# Patient Record
Sex: MALE | Race: WHITE | HISPANIC OR LATINO | Employment: OTHER | URBAN - METROPOLITAN AREA
[De-identification: names, ages, dates, MRNs, and addresses within clinical notes are randomized per-mention and may not be internally consistent; named-entity substitution may affect disease eponyms.]

---

## 2018-10-17 ENCOUNTER — APPOINTMENT (OUTPATIENT)
Dept: CARDIOLOGY | Facility: MEDICAL CENTER | Age: 53
DRG: 247 | End: 2018-10-17
Attending: INTERNAL MEDICINE
Payer: COMMERCIAL

## 2018-10-17 ENCOUNTER — APPOINTMENT (OUTPATIENT)
Dept: RADIOLOGY | Facility: MEDICAL CENTER | Age: 53
DRG: 247 | End: 2018-10-17
Attending: EMERGENCY MEDICINE
Payer: COMMERCIAL

## 2018-10-17 ENCOUNTER — HOSPITAL ENCOUNTER (OUTPATIENT)
Dept: RADIOLOGY | Facility: MEDICAL CENTER | Age: 53
End: 2018-10-17

## 2018-10-17 ENCOUNTER — HOSPITAL ENCOUNTER (INPATIENT)
Facility: MEDICAL CENTER | Age: 53
LOS: 1 days | DRG: 247 | End: 2018-10-18
Attending: EMERGENCY MEDICINE | Admitting: HOSPITALIST
Payer: COMMERCIAL

## 2018-10-17 DIAGNOSIS — I21.4 NON-ST ELEVATION (NSTEMI) MYOCARDIAL INFARCTION (HCC): ICD-10-CM

## 2018-10-17 PROBLEM — Z72.0 TOBACCO USE: Status: ACTIVE | Noted: 2018-10-17

## 2018-10-17 PROBLEM — E66.09 CLASS 1 OBESITY DUE TO EXCESS CALORIES WITHOUT SERIOUS COMORBIDITY WITH BODY MASS INDEX (BMI) OF 30.0 TO 30.9 IN ADULT: Status: ACTIVE | Noted: 2018-10-17

## 2018-10-17 LAB
ALBUMIN SERPL BCP-MCNC: 3.7 G/DL (ref 3.2–4.9)
ALBUMIN/GLOB SERPL: 1.7 G/DL
ALP SERPL-CCNC: 51 U/L (ref 30–99)
ALT SERPL-CCNC: 16 U/L (ref 2–50)
ANION GAP SERPL CALC-SCNC: 8 MMOL/L (ref 0–11.9)
APTT PPP: 176.7 SEC (ref 24.7–36)
APTT PPP: 215.6 SEC (ref 24.7–36)
AST SERPL-CCNC: 16 U/L (ref 12–45)
BASOPHILS # BLD AUTO: 0.4 % (ref 0–1.8)
BASOPHILS # BLD: 0.05 K/UL (ref 0–0.12)
BILIRUB SERPL-MCNC: 0.3 MG/DL (ref 0.1–1.5)
BNP SERPL-MCNC: 12 PG/ML (ref 0–100)
BUN SERPL-MCNC: 19 MG/DL (ref 8–22)
CALCIUM SERPL-MCNC: 8.7 MG/DL (ref 8.5–10.5)
CHLORIDE SERPL-SCNC: 110 MMOL/L (ref 96–112)
CO2 SERPL-SCNC: 23 MMOL/L (ref 20–33)
CREAT SERPL-MCNC: 1.19 MG/DL (ref 0.5–1.4)
D DIMER PPP IA.FEU-MCNC: <0.4 UG/ML (FEU) (ref 0–0.5)
EKG IMPRESSION: NORMAL
EKG IMPRESSION: NORMAL
EOSINOPHIL # BLD AUTO: 0.25 K/UL (ref 0–0.51)
EOSINOPHIL NFR BLD: 1.9 % (ref 0–6.9)
ERYTHROCYTE [DISTWIDTH] IN BLOOD BY AUTOMATED COUNT: 42.7 FL (ref 35.9–50)
EST. AVERAGE GLUCOSE BLD GHB EST-MCNC: 123 MG/DL
GLOBULIN SER CALC-MCNC: 2.2 G/DL (ref 1.9–3.5)
GLUCOSE SERPL-MCNC: 114 MG/DL (ref 65–99)
HBA1C MFR BLD: 5.9 % (ref 0–5.6)
HCT VFR BLD AUTO: 43.4 % (ref 42–52)
HGB BLD-MCNC: 15.3 G/DL (ref 14–18)
IMM GRANULOCYTES # BLD AUTO: 0.07 K/UL (ref 0–0.11)
IMM GRANULOCYTES NFR BLD AUTO: 0.5 % (ref 0–0.9)
INR PPP: 1.04 (ref 0.87–1.13)
LV EJECT FRACT  99904: 65
LV EJECT FRACT MOD 2C 99903: 69
LV EJECT FRACT MOD 4C 99902: 62.1
LV EJECT FRACT MOD BP 99901: 66.72
LYMPHOCYTES # BLD AUTO: 2.4 K/UL (ref 1–4.8)
LYMPHOCYTES NFR BLD: 18.3 % (ref 22–41)
MCH RBC QN AUTO: 33.1 PG (ref 27–33)
MCHC RBC AUTO-ENTMCNC: 35.3 G/DL (ref 33.7–35.3)
MCV RBC AUTO: 93.9 FL (ref 81.4–97.8)
MONOCYTES # BLD AUTO: 0.7 K/UL (ref 0–0.85)
MONOCYTES NFR BLD AUTO: 5.3 % (ref 0–13.4)
NEUTROPHILS # BLD AUTO: 9.63 K/UL (ref 1.82–7.42)
NEUTROPHILS NFR BLD: 73.6 % (ref 44–72)
NRBC # BLD AUTO: 0 K/UL
NRBC BLD-RTO: 0 /100 WBC
PLATELET # BLD AUTO: 182 K/UL (ref 164–446)
PMV BLD AUTO: 10.2 FL (ref 9–12.9)
POTASSIUM SERPL-SCNC: 4.1 MMOL/L (ref 3.6–5.5)
PROT SERPL-MCNC: 5.9 G/DL (ref 6–8.2)
PROTHROMBIN TIME: 13.7 SEC (ref 12–14.6)
RBC # BLD AUTO: 4.62 M/UL (ref 4.7–6.1)
SODIUM SERPL-SCNC: 141 MMOL/L (ref 135–145)
TROPONIN I SERPL-MCNC: 1.17 NG/ML (ref 0–0.04)
TROPONIN I SERPL-MCNC: 2.82 NG/ML (ref 0–0.04)
TROPONIN I SERPL-MCNC: 3.58 NG/ML (ref 0–0.04)
TROPONIN I SERPL-MCNC: 4.18 NG/ML (ref 0–0.04)
TROPONIN I SERPL-MCNC: 4.69 NG/ML (ref 0–0.04)
WBC # BLD AUTO: 13.1 K/UL (ref 4.8–10.8)

## 2018-10-17 PROCEDURE — 027034Z DILATION OF CORONARY ARTERY, ONE ARTERY WITH DRUG-ELUTING INTRALUMINAL DEVICE, PERCUTANEOUS APPROACH: ICD-10-PCS | Performed by: INTERNAL MEDICINE

## 2018-10-17 PROCEDURE — 700102 HCHG RX REV CODE 250 W/ 637 OVERRIDE(OP): Performed by: INTERNAL MEDICINE

## 2018-10-17 PROCEDURE — C1894 INTRO/SHEATH, NON-LASER: HCPCS

## 2018-10-17 PROCEDURE — 93306 TTE W/DOPPLER COMPLETE: CPT

## 2018-10-17 PROCEDURE — 96366 THER/PROPH/DIAG IV INF ADDON: CPT

## 2018-10-17 PROCEDURE — 700117 HCHG RX CONTRAST REV CODE 255: Performed by: INTERNAL MEDICINE

## 2018-10-17 PROCEDURE — 99291 CRITICAL CARE FIRST HOUR: CPT

## 2018-10-17 PROCEDURE — 85730 THROMBOPLASTIN TIME PARTIAL: CPT

## 2018-10-17 PROCEDURE — 770020 HCHG ROOM/CARE - TELE (206)

## 2018-10-17 PROCEDURE — 93306 TTE W/DOPPLER COMPLETE: CPT | Mod: 26 | Performed by: INTERNAL MEDICINE

## 2018-10-17 PROCEDURE — 99152 MOD SED SAME PHYS/QHP 5/>YRS: CPT

## 2018-10-17 PROCEDURE — 85610 PROTHROMBIN TIME: CPT

## 2018-10-17 PROCEDURE — 83880 ASSAY OF NATRIURETIC PEPTIDE: CPT

## 2018-10-17 PROCEDURE — 85379 FIBRIN DEGRADATION QUANT: CPT

## 2018-10-17 PROCEDURE — 700111 HCHG RX REV CODE 636 W/ 250 OVERRIDE (IP)

## 2018-10-17 PROCEDURE — 92928 PRQ TCAT PLMT NTRAC ST 1 LES: CPT | Mod: LC | Performed by: INTERNAL MEDICINE

## 2018-10-17 PROCEDURE — 99245 OFF/OP CONSLTJ NEW/EST HI 55: CPT | Performed by: INTERNAL MEDICINE

## 2018-10-17 PROCEDURE — A9270 NON-COVERED ITEM OR SERVICE: HCPCS | Performed by: INTERNAL MEDICINE

## 2018-10-17 PROCEDURE — 71045 X-RAY EXAM CHEST 1 VIEW: CPT

## 2018-10-17 PROCEDURE — 700111 HCHG RX REV CODE 636 W/ 250 OVERRIDE (IP): Performed by: HOSPITALIST

## 2018-10-17 PROCEDURE — 99153 MOD SED SAME PHYS/QHP EA: CPT

## 2018-10-17 PROCEDURE — 80053 COMPREHEN METABOLIC PANEL: CPT

## 2018-10-17 PROCEDURE — 36415 COLL VENOUS BLD VENIPUNCTURE: CPT

## 2018-10-17 PROCEDURE — B2151ZZ FLUOROSCOPY OF LEFT HEART USING LOW OSMOLAR CONTRAST: ICD-10-PCS | Performed by: INTERNAL MEDICINE

## 2018-10-17 PROCEDURE — 93458 L HRT ARTERY/VENTRICLE ANGIO: CPT | Mod: 26,59 | Performed by: INTERNAL MEDICINE

## 2018-10-17 PROCEDURE — 99223 1ST HOSP IP/OBS HIGH 75: CPT | Performed by: HOSPITALIST

## 2018-10-17 PROCEDURE — 84484 ASSAY OF TROPONIN QUANT: CPT

## 2018-10-17 PROCEDURE — 96375 TX/PRO/DX INJ NEW DRUG ADDON: CPT

## 2018-10-17 PROCEDURE — A9270 NON-COVERED ITEM OR SERVICE: HCPCS

## 2018-10-17 PROCEDURE — C1887 CATHETER, GUIDING: HCPCS

## 2018-10-17 PROCEDURE — 96365 THER/PROPH/DIAG IV INF INIT: CPT

## 2018-10-17 PROCEDURE — 85025 COMPLETE CBC W/AUTO DIFF WBC: CPT

## 2018-10-17 PROCEDURE — C1769 GUIDE WIRE: HCPCS

## 2018-10-17 PROCEDURE — C1760 CLOSURE DEV, VASC: HCPCS

## 2018-10-17 PROCEDURE — 83036 HEMOGLOBIN GLYCOSYLATED A1C: CPT

## 2018-10-17 PROCEDURE — C1725 CATH, TRANSLUMIN NON-LASER: HCPCS

## 2018-10-17 PROCEDURE — 700102 HCHG RX REV CODE 250 W/ 637 OVERRIDE(OP)

## 2018-10-17 PROCEDURE — 304952 HCHG R 2 PADS

## 2018-10-17 PROCEDURE — 93010 ELECTROCARDIOGRAM REPORT: CPT | Performed by: INTERNAL MEDICINE

## 2018-10-17 PROCEDURE — 700105 HCHG RX REV CODE 258: Performed by: HOSPITALIST

## 2018-10-17 PROCEDURE — 93005 ELECTROCARDIOGRAM TRACING: CPT

## 2018-10-17 PROCEDURE — 93458 L HRT ARTERY/VENTRICLE ANGIO: CPT

## 2018-10-17 PROCEDURE — 307093 HCHG TR BAND RADIAL

## 2018-10-17 PROCEDURE — 93005 ELECTROCARDIOGRAM TRACING: CPT | Performed by: EMERGENCY MEDICINE

## 2018-10-17 PROCEDURE — C9600 PERC DRUG-EL COR STENT SING: HCPCS | Mod: LC

## 2018-10-17 PROCEDURE — 93005 ELECTROCARDIOGRAM TRACING: CPT | Performed by: INTERNAL MEDICINE

## 2018-10-17 PROCEDURE — 02JY3ZZ INSPECTION OF GREAT VESSEL, PERCUTANEOUS APPROACH: ICD-10-PCS | Performed by: INTERNAL MEDICINE

## 2018-10-17 PROCEDURE — B2111ZZ FLUOROSCOPY OF MULTIPLE CORONARY ARTERIES USING LOW OSMOLAR CONTRAST: ICD-10-PCS | Performed by: INTERNAL MEDICINE

## 2018-10-17 PROCEDURE — 4A023N7 MEASUREMENT OF CARDIAC SAMPLING AND PRESSURE, LEFT HEART, PERCUTANEOUS APPROACH: ICD-10-PCS | Performed by: INTERNAL MEDICINE

## 2018-10-17 PROCEDURE — 360979 HCHG DIAGNOSTIC CATH

## 2018-10-17 PROCEDURE — C1874 STENT, COATED/COV W/DEL SYS: HCPCS

## 2018-10-17 PROCEDURE — 700105 HCHG RX REV CODE 258: Performed by: INTERNAL MEDICINE

## 2018-10-17 RX ORDER — PROMETHAZINE HYDROCHLORIDE 25 MG/1
12.5-25 TABLET ORAL EVERY 4 HOURS PRN
Status: DISCONTINUED | OUTPATIENT
Start: 2018-10-17 | End: 2018-10-18 | Stop reason: HOSPADM

## 2018-10-17 RX ORDER — BIVALIRUDIN 250 MG/5ML
INJECTION, POWDER, LYOPHILIZED, FOR SOLUTION INTRAVENOUS
Status: COMPLETED
Start: 2018-10-17 | End: 2018-10-17

## 2018-10-17 RX ORDER — PRASUGREL 10 MG/1
TABLET, FILM COATED ORAL
Status: COMPLETED
Start: 2018-10-17 | End: 2018-10-17

## 2018-10-17 RX ORDER — BISACODYL 10 MG
10 SUPPOSITORY, RECTAL RECTAL
Status: DISCONTINUED | OUTPATIENT
Start: 2018-10-17 | End: 2018-10-18 | Stop reason: HOSPADM

## 2018-10-17 RX ORDER — ATORVASTATIN CALCIUM 20 MG/1
80 TABLET, FILM COATED ORAL EVERY EVENING
Status: DISCONTINUED | OUTPATIENT
Start: 2018-10-17 | End: 2018-10-17

## 2018-10-17 RX ORDER — ATORVASTATIN CALCIUM 40 MG/1
40 TABLET, FILM COATED ORAL
Status: DISCONTINUED | OUTPATIENT
Start: 2018-10-17 | End: 2018-10-18 | Stop reason: HOSPADM

## 2018-10-17 RX ORDER — MIDAZOLAM HYDROCHLORIDE 1 MG/ML
INJECTION INTRAMUSCULAR; INTRAVENOUS
Status: COMPLETED
Start: 2018-10-17 | End: 2018-10-17

## 2018-10-17 RX ORDER — MORPHINE SULFATE 4 MG/ML
2-4 INJECTION, SOLUTION INTRAMUSCULAR; INTRAVENOUS
Status: DISCONTINUED | OUTPATIENT
Start: 2018-10-17 | End: 2018-10-18 | Stop reason: HOSPADM

## 2018-10-17 RX ORDER — NITROGLYCERIN 0.4 MG/1
0.4 TABLET SUBLINGUAL
Status: DISCONTINUED | OUTPATIENT
Start: 2018-10-17 | End: 2018-10-18 | Stop reason: HOSPADM

## 2018-10-17 RX ORDER — HEPARIN SODIUM 1000 [USP'U]/ML
3200 INJECTION, SOLUTION INTRAVENOUS; SUBCUTANEOUS PRN
Status: DISCONTINUED | OUTPATIENT
Start: 2018-10-17 | End: 2018-10-17

## 2018-10-17 RX ORDER — SODIUM CHLORIDE 9 MG/ML
INJECTION, SOLUTION INTRAVENOUS CONTINUOUS
Status: DISCONTINUED | OUTPATIENT
Start: 2018-10-17 | End: 2018-10-17

## 2018-10-17 RX ORDER — ACETAMINOPHEN 325 MG/1
650 TABLET ORAL EVERY 6 HOURS PRN
Status: DISCONTINUED | OUTPATIENT
Start: 2018-10-17 | End: 2018-10-18 | Stop reason: HOSPADM

## 2018-10-17 RX ORDER — ASPIRIN 325 MG
325 TABLET ORAL DAILY
Status: DISCONTINUED | OUTPATIENT
Start: 2018-10-17 | End: 2018-10-17

## 2018-10-17 RX ORDER — HEPARIN SODIUM 1000 [USP'U]/ML
6000 INJECTION, SOLUTION INTRAVENOUS; SUBCUTANEOUS ONCE
Status: DISCONTINUED | OUTPATIENT
Start: 2018-10-17 | End: 2018-10-17

## 2018-10-17 RX ORDER — SODIUM CHLORIDE 9 MG/ML
INJECTION, SOLUTION INTRAVENOUS CONTINUOUS
Status: DISCONTINUED | OUTPATIENT
Start: 2018-10-17 | End: 2018-10-18

## 2018-10-17 RX ORDER — LIDOCAINE HYDROCHLORIDE 10 MG/ML
INJECTION, SOLUTION EPIDURAL; INFILTRATION; INTRACAUDAL; PERINEURAL
Status: COMPLETED
Start: 2018-10-17 | End: 2018-10-17

## 2018-10-17 RX ORDER — ONDANSETRON 4 MG/1
4 TABLET, ORALLY DISINTEGRATING ORAL EVERY 4 HOURS PRN
Status: DISCONTINUED | OUTPATIENT
Start: 2018-10-17 | End: 2018-10-18 | Stop reason: HOSPADM

## 2018-10-17 RX ORDER — ONDANSETRON 2 MG/ML
4 INJECTION INTRAMUSCULAR; INTRAVENOUS EVERY 4 HOURS PRN
Status: DISCONTINUED | OUTPATIENT
Start: 2018-10-17 | End: 2018-10-18 | Stop reason: HOSPADM

## 2018-10-17 RX ORDER — HEPARIN SODIUM,PORCINE 1000/ML
VIAL (ML) INJECTION
Status: COMPLETED
Start: 2018-10-17 | End: 2018-10-17

## 2018-10-17 RX ORDER — NICOTINE 21 MG/24HR
21 PATCH, TRANSDERMAL 24 HOURS TRANSDERMAL
Status: DISCONTINUED | OUTPATIENT
Start: 2018-10-17 | End: 2018-10-18 | Stop reason: HOSPADM

## 2018-10-17 RX ORDER — NITROGLYCERIN 80 MG/1
1 PATCH TRANSDERMAL DAILY
Status: DISCONTINUED | OUTPATIENT
Start: 2018-10-17 | End: 2018-10-18 | Stop reason: HOSPADM

## 2018-10-17 RX ORDER — PRASUGREL 10 MG/1
60 TABLET, FILM COATED ORAL ONCE
Status: COMPLETED | OUTPATIENT
Start: 2018-10-17 | End: 2018-10-17

## 2018-10-17 RX ORDER — ASPIRIN 300 MG/1
300 SUPPOSITORY RECTAL DAILY
Status: DISCONTINUED | OUTPATIENT
Start: 2018-10-17 | End: 2018-10-17

## 2018-10-17 RX ORDER — CARVEDILOL 6.25 MG/1
6.25 TABLET ORAL 2 TIMES DAILY WITH MEALS
Status: DISCONTINUED | OUTPATIENT
Start: 2018-10-17 | End: 2018-10-17

## 2018-10-17 RX ORDER — HEPARIN SODIUM 1000 [USP'U]/ML
6000 INJECTION, SOLUTION INTRAVENOUS; SUBCUTANEOUS ONCE
Status: COMPLETED | OUTPATIENT
Start: 2018-10-17 | End: 2018-10-17

## 2018-10-17 RX ORDER — PROMETHAZINE HYDROCHLORIDE 25 MG/1
12.5-25 SUPPOSITORY RECTAL EVERY 4 HOURS PRN
Status: DISCONTINUED | OUTPATIENT
Start: 2018-10-17 | End: 2018-10-18 | Stop reason: HOSPADM

## 2018-10-17 RX ORDER — POLYETHYLENE GLYCOL 3350 17 G/17G
1 POWDER, FOR SOLUTION ORAL
Status: DISCONTINUED | OUTPATIENT
Start: 2018-10-17 | End: 2018-10-18 | Stop reason: HOSPADM

## 2018-10-17 RX ORDER — AMOXICILLIN 250 MG
2 CAPSULE ORAL 2 TIMES DAILY
Status: DISCONTINUED | OUTPATIENT
Start: 2018-10-17 | End: 2018-10-18 | Stop reason: HOSPADM

## 2018-10-17 RX ORDER — PRASUGREL 10 MG/1
10 TABLET, FILM COATED ORAL DAILY
Status: DISCONTINUED | OUTPATIENT
Start: 2018-10-18 | End: 2018-10-18 | Stop reason: HOSPADM

## 2018-10-17 RX ORDER — ASPIRIN 81 MG/1
324 TABLET, CHEWABLE ORAL DAILY
Status: DISCONTINUED | OUTPATIENT
Start: 2018-10-17 | End: 2018-10-17

## 2018-10-17 RX ORDER — OMEPRAZOLE 20 MG/1
20 CAPSULE, DELAYED RELEASE ORAL DAILY
COMMUNITY

## 2018-10-17 RX ORDER — VERAPAMIL HYDROCHLORIDE 2.5 MG/ML
INJECTION, SOLUTION INTRAVENOUS
Status: COMPLETED
Start: 2018-10-17 | End: 2018-10-17

## 2018-10-17 RX ORDER — CLONIDINE HYDROCHLORIDE 0.1 MG/1
0.1 TABLET ORAL EVERY 6 HOURS PRN
Status: DISCONTINUED | OUTPATIENT
Start: 2018-10-17 | End: 2018-10-18 | Stop reason: HOSPADM

## 2018-10-17 RX ADMIN — MIDAZOLAM HYDROCHLORIDE 2 MG: 1 INJECTION, SOLUTION INTRAMUSCULAR; INTRAVENOUS at 14:27

## 2018-10-17 RX ADMIN — HEPARIN SODIUM 6000 UNITS: 1000 INJECTION, SOLUTION INTRAVENOUS; SUBCUTANEOUS at 06:42

## 2018-10-17 RX ADMIN — SODIUM CHLORIDE: 9 INJECTION, SOLUTION INTRAVENOUS at 18:23

## 2018-10-17 RX ADMIN — PRASUGREL 60 MG: 10 TABLET, FILM COATED ORAL at 14:38

## 2018-10-17 RX ADMIN — HEPARIN SODIUM: 1000 INJECTION, SOLUTION INTRAVENOUS; SUBCUTANEOUS at 14:03

## 2018-10-17 RX ADMIN — IOHEXOL 149 ML: 350 INJECTION, SOLUTION INTRAVENOUS at 14:53

## 2018-10-17 RX ADMIN — NITROGLYCERIN TRANSDERMAL SYSTEM 1 PATCH: 0.4 PATCH, EXTENDED RELEASE TRANSDERMAL at 16:41

## 2018-10-17 RX ADMIN — FENTANYL CITRATE 100 MCG: 50 INJECTION INTRAMUSCULAR; INTRAVENOUS at 14:49

## 2018-10-17 RX ADMIN — SODIUM CHLORIDE: 9 INJECTION, SOLUTION INTRAVENOUS at 12:25

## 2018-10-17 RX ADMIN — VERAPAMIL HYDROCHLORIDE 2.5 MG: 2.5 INJECTION INTRAVENOUS at 14:03

## 2018-10-17 RX ADMIN — HEPARIN SODIUM 2000 UNITS: 200 INJECTION, SOLUTION INTRAVENOUS at 14:04

## 2018-10-17 RX ADMIN — METOPROLOL TARTRATE 25 MG: 25 TABLET, FILM COATED ORAL at 16:42

## 2018-10-17 RX ADMIN — BIVALIRUDIN 250 MG: 250 INJECTION, POWDER, LYOPHILIZED, FOR SOLUTION INTRAVENOUS at 14:23

## 2018-10-17 RX ADMIN — FENTANYL CITRATE 100 MCG: 50 INJECTION INTRAMUSCULAR; INTRAVENOUS at 14:27

## 2018-10-17 RX ADMIN — LIDOCAINE HYDROCHLORIDE 5 ML: 10 INJECTION, SOLUTION EPIDURAL; INFILTRATION; INTRACAUDAL; PERINEURAL at 14:03

## 2018-10-17 RX ADMIN — NITROGLYCERIN 10 ML: 20 INJECTION INTRAVENOUS at 14:03

## 2018-10-17 RX ADMIN — HEPARIN SODIUM 1200 UNITS/HR: 5000 INJECTION, SOLUTION INTRAVENOUS at 06:45

## 2018-10-17 RX ADMIN — HEPARIN SODIUM: 1000 INJECTION, SOLUTION INTRAVENOUS; SUBCUTANEOUS at 14:27

## 2018-10-17 RX ADMIN — ATORVASTATIN CALCIUM 40 MG: 40 TABLET, FILM COATED ORAL at 20:36

## 2018-10-17 ASSESSMENT — PATIENT HEALTH QUESTIONNAIRE - PHQ9
2. FEELING DOWN, DEPRESSED, IRRITABLE, OR HOPELESS: NOT AT ALL
1. LITTLE INTEREST OR PLEASURE IN DOING THINGS: NOT AT ALL
SUM OF ALL RESPONSES TO PHQ9 QUESTIONS 1 AND 2: 0

## 2018-10-17 ASSESSMENT — ENCOUNTER SYMPTOMS
MUSCULOSKELETAL NEGATIVE: 1
RESPIRATORY NEGATIVE: 1
PSYCHIATRIC NEGATIVE: 1
GASTROINTESTINAL NEGATIVE: 1
CONSTITUTIONAL NEGATIVE: 1
EYES NEGATIVE: 1
NEUROLOGICAL NEGATIVE: 1

## 2018-10-17 ASSESSMENT — COGNITIVE AND FUNCTIONAL STATUS - GENERAL
DAILY ACTIVITIY SCORE: 24
SUGGESTED CMS G CODE MODIFIER MOBILITY: CH
SUGGESTED CMS G CODE MODIFIER DAILY ACTIVITY: CH
MOBILITY SCORE: 24

## 2018-10-17 ASSESSMENT — LIFESTYLE VARIABLES
CONSUMPTION TOTAL: POSITIVE
EVER FELT BAD OR GUILTY ABOUT YOUR DRINKING: NO
TOTAL SCORE: 0
EVER HAD A DRINK FIRST THING IN THE MORNING TO STEADY YOUR NERVES TO GET RID OF A HANGOVER: NO
TOTAL SCORE: 0
HOW MANY TIMES IN THE PAST YEAR HAVE YOU HAD 5 OR MORE DRINKS IN A DAY: 20
ON A TYPICAL DAY WHEN YOU DRINK ALCOHOL HOW MANY DRINKS DO YOU HAVE: 3
AVERAGE NUMBER OF DAYS PER WEEK YOU HAVE A DRINK CONTAINING ALCOHOL: 2
TOTAL SCORE: 0
HAVE YOU EVER FELT YOU SHOULD CUT DOWN ON YOUR DRINKING: NO
HAVE PEOPLE ANNOYED YOU BY CRITICIZING YOUR DRINKING: NO
DO YOU DRINK ALCOHOL: YES

## 2018-10-17 ASSESSMENT — PAIN SCALES - GENERAL
PAINLEVEL_OUTOF10: 2
PAINLEVEL_OUTOF10: 3
PAINLEVEL_OUTOF10: 2
PAINLEVEL_OUTOF10: 0

## 2018-10-17 ASSESSMENT — PAIN DESCRIPTION - DESCRIPTORS: DESCRIPTORS: PRESSURE

## 2018-10-17 NOTE — ED TRIAGE NOTES
Chief Complaint   Patient presents with   • Chest Pain     BIB EMS     Pt is a  woke up at midnight with chest pain/pressure radiating to L arm. EMS arrived at [facility] to transport pt to [facility]    Pt with elevated trop .22  PTA tx: nitro x 3, 325 ASA, 4 morphine    Chest pain now 2/10    EKG en route NSR with occasional PVCs.

## 2018-10-17 NOTE — ED NOTES
Dr Mancia notified of patient's elevated troponin.  Echo at bedside.  Lab unable to add on APTT to labs drawn prior to heparin administration due to time limits on sample.  Will draw APTT after echo is finished.

## 2018-10-17 NOTE — ED NOTES
Med rec updated per pt at bedside  Pt reports he only takes a medication for cholesterol, but is unsure of the name  Waiting for pt's home pharmacy to open to verify what medication it is  Allergies reviewed - NKDA  No ABX in last 30 days

## 2018-10-17 NOTE — ED NOTES
Dr. Abdullahi notified of critical lab result of increased troponin 1.17 at 0456.  Critical lab result read back by Dr. Abdullahi.

## 2018-10-17 NOTE — ED NOTES
Dr Madyson Boudreaux notified of patient's elevated troponin and APTT.  Pharmacy notified of APTT, will draw the 6 hour after administration APTT at 1245 and titrate according to protocol.  Patient reporting pain at 2/10 at this time.

## 2018-10-17 NOTE — CONSULTS
DATE OF SERVICE:  10/17/2018    CARDIOLOGY CONSULT    REFERRING PHYSICIAN:  Lizbeth Abdullahi MD    CHIEF COMPLAINT:  1.  Chest pain.  2.  Non-STEMI.    HISTORY OF PRESENT ILLNESS:  The patient is a 53-year-old male with past   medical history significant for dyslipidemia and chronic tobacco abuse.    The patient was well until midnight tonight when he awoke with 10/10 mid chest  pressure sensation without radiation, associated with nausea and shortness of  breath.  Patient denied associated vomiting or diaphoresis.  His pain lasted   for approximately 2 hours.  He was evaluated at [facility] and   underwent an EKG, which did not demonstrate significant ST abnormalities.    He was then transferred to [facility] for further care.    ALLERGIES:  No known drug allergies.    MEDICATIONS:  Unknown statin.    PAST MEDICAL ILLNESS:  As above.    PAST SURGICAL HISTORY:  None.    SOCIAL HISTORY:  He is single, positive for tobacco abuse.    FAMILY HISTORY:  Negative for coronary artery disease or early myocardial   infarction.    REVIEW OF SYSTEMS:  Positive only for chest pain, shortness of breath and   nausea as described above.  GENERAL: The patient denies fatigue, fever, chills or weight changes.   HEENT: The patient denies headache, visual or hearing changes.   CENTRAL NERVOUS SYSTEM: The patient denies lightheadedness,   syncope or seizures.   ENDOCRINE: The patient denies thyroid disorder or diabetes.   RESPIRATORY: The patient denies productive cough, asthma or emphysema.  CARDIOVASCULAR: As above.   GASTROINTESTINAL: The patient denies bowel changes.   GENITOURINARY: The patient denies urinary changes.   PSYCHIATRIC: The patient denies depression or anxiety.   EXTREMITIES: The patient denies arthritis.    PHYSICAL EXAMINATION:  Includes:  VITAL SIGNS:  Pulse 61, respiration 19, /88, temperature 36.4.  GENERAL:  No acute distress.  HEENT:  Eyes equal, oral moist.  NECK:   Supple.  RESPIRATORY:  Clear to auscultation bilaterally.  Good effort.  CARDIOVASCULAR:  S1, S2 regular rate and rhythm without S3, S4 or murmur.  ABDOMEN:  Soft, nondistended, nontender.  No hepatosplenomegaly noted.  EXTREMITIES:  Upper extremities, no clubbing, cyanosis.  Lower extremity, no   edema.  NEUROLOGIC:  Alert and oriented x3.  PSYCHIATRIC:  No anxiety or depression.  SKIN:  Warm and dry.    DIAGNOSTIC DATA:  EKG, slow sinus arrhythmia.    LABORATORY DATA:  BUN 19, creatinine of 1.19.  Troponin I 1.17.    ASSESSMENT AND PLAN:  1.  Chest pain with a non-ST segment elevation myocardial infarction:  The   patient is a 53-year-old male with past medical history significant for   dyslipidemia, chronic tobacco abuse.  The patient was transferred from [facility] for non-STEMI.  He is clinically stable at this point.  We   will start IV heparin as well as medications includes beta blockade.  We will   schedule for cardiac catheterization.  He understands the risk and benefits   and agrees with plan.  2.  Dyslipidemia:  We will continue with statin therapy.  3.  Chronic tobacco abuse:  We did discuss importance of smoking cessation.    Thank you for this consult.       ____________________________________     MD STEVEN RYAN / DELMER    DD:  10/17/2018 05:58:07  DT:  10/17/2018 06:41:22    D#:  [id number]  Job#:  [id number]

## 2018-10-17 NOTE — CARE PLAN
Problem: Communication  Goal: The ability to communicate needs accurately and effectively will improve  Outcome: PROGRESSING AS EXPECTED  Pt updated on plan of care, white board updated    Problem: Safety  Goal: Will remain free from injury  Outcome: PROGRESSING AS EXPECTED  Pt instructed to use call light, fall measures in place, fall education provided

## 2018-10-17 NOTE — H&P
Hospital Medicine History & Physical Note    Date of Service  10/17/2018    Primary Care Physician  No primary care provider on file.    Consultants  Cardiology Dr. Hermosillo    Code Status  Full code     Chief Complaint  Chest pain    History of Presenting Illness  53 y.o. male with history of dyslipidemia, and concurrent tobacco use, was in his usual state of health until 2 weeks prior to admission.  He began to have episodes of chest pressure.  This was on the left side of his chest with radiation down both arms, and a numb feeling to the arms.  He noted initially that this was only occurring with activity, and completely relieved by rest.  On the day of admission at midnight, he had the sudden onset of severe chest pressure, which did not alleviate with any maneuvers.  This seemed to be worse than prior, and he subsequently presented to the emergency department for further evaluation.  He currently denies any shortness of breath, he has no fever or chills, no abdominal pain, nausea vomiting, diarrhea or constipation.  No other complaints.    Review of Systems  Review of Systems   Constitutional: Negative.    HENT: Negative.    Eyes: Negative.    Respiratory: Negative.    Cardiovascular: Positive for chest pain.   Gastrointestinal: Negative.    Genitourinary: Negative.    Musculoskeletal: Negative.    Skin: Negative.    Neurological: Negative.    Endo/Heme/Allergies: Negative.    Psychiatric/Behavioral: Negative.        Past Medical History   has a past medical history of Dyslipidemia.    Surgical History   has no past surgical history on file.     Family History  family history includes Cancer in his father and mother.     Social History   reports that he has been smoking Cigarettes.  He has a 10.00 pack-year smoking history. He has never used smokeless tobacco. He reports that he drinks alcohol. He reports that he does not use drugs.    Allergies  No Known Allergies    Medications  None       Physical Exam  Temp:   [36.4 °C (97.5 °F)] 36.4 °C (97.5 °F)  Pulse:  [58-61] 61  Resp:  [16-19] 19  BP: (147)/(88) 147/88    Physical Exam   Constitutional: He is oriented to person, place, and time. He appears well-developed and well-nourished. No distress.   HENT:   Head: Normocephalic and atraumatic.   Eyes: Pupils are equal, round, and reactive to light. Conjunctivae are normal.   Neck: Normal range of motion. Neck supple. No tracheal deviation present. No thyromegaly present.   Cardiovascular: Normal rate, regular rhythm and normal heart sounds.  Exam reveals no gallop and no friction rub.    No murmur heard.  Pulmonary/Chest: Effort normal and breath sounds normal. No respiratory distress. He has no wheezes.   Abdominal: Soft. Bowel sounds are normal. He exhibits no distension and no mass. There is no tenderness. There is no rebound and no guarding.   Musculoskeletal: Normal range of motion. He exhibits no edema.   Lymphadenopathy:     He has no cervical adenopathy.   Neurological: He is alert and oriented to person, place, and time. No cranial nerve deficit.   Skin: Skin is warm and dry. He is not diaphoretic.   Psychiatric: He has a normal mood and affect.   Nursing note and vitals reviewed.      Laboratory:  Recent Labs      10/17/18   0359   WBC  13.1*   RBC  4.62*   HEMOGLOBIN  15.3   HEMATOCRIT  43.4   MCV  93.9   MCH  33.1*   MCHC  35.3   RDW  42.7   PLATELETCT  182   MPV  10.2     Recent Labs      10/17/18   0359   SODIUM  141   POTASSIUM  4.1   CHLORIDE  110   CO2  23   GLUCOSE  114*   BUN  19   CREATININE  1.19   CALCIUM  8.7     Recent Labs      10/17/18   0359   ALTSGPT  16   ASTSGOT  16   ALKPHOSPHAT  51   TBILIRUBIN  0.3   GLUCOSE  114*         Recent Labs      10/17/18   0359   BNPBTYPENAT  12         Recent Labs      10/17/18   0359   TROPONINI  1.17*       Urinalysis:    No results found     Imaging:  DX-CHEST-PORTABLE (1 VIEW)   Final Result         1.  No acute cardiopulmonary disease.      EC-ECHOCARDIOGRAM  COMPLETE W/O CONT    (Results Pending)         Assessment/Plan:  I anticipate this patient will require at least two midnights for appropriate medical management, necessitating inpatient admission.    * NSTEMI (non-ST elevated myocardial infarction) (HCC)   Assessment & Plan    Admit, start heparin infusion, appreciate cardiology consultation.  Trend troponin.         Class 1 obesity due to excess calories without serious comorbidity with body mass index (BMI) of 30.0 to 30.9 in adult   Assessment & Plan    Body mass index is 30.74 kg/m².          Tobacco use   Assessment & Plan    Greater than 1 pack per day.  Cessation.            VTE prophylaxis: SCD, heparin infusion

## 2018-10-17 NOTE — PROGRESS NOTES
Kristel from Lab called with critical result of aptt  Of 176.7 at 1424. Critical lab result read back to Kristel.   This critical lab result is within parameters established by the heparin protocol for this patient.

## 2018-10-17 NOTE — ED PROVIDER NOTES
ED Provider Note    Chief Complaint:   NSTEMI    HPI:  Leander Dolan is a 53 y.o. male who presents as a transfer from [facility] for non-ST elevation MI.  He was noted to have chest pain 1-2 hours prior to arrival at the outlying facility.  He states he has been having intermittent chest pain for the past week, his chest pain became persistent this evening prompting him to present to the emergency department.  He describes a substernal chest pressure radiating to both arms.  He did not have any associated shortness of breath, had some mild lightheadedness, describes feeling sweaty when his symptoms began.  He did have some mild associated nausea without vomiting.  Denies any abdominal pain, denies pain radiating to the thoracic back.  He is a  by trade, states he does occasionally do long-haul trips between [facility] in [place].  He has never had a DVT, denies any leg pain or leg swelling recently.  He has no prior history of malignancy.  Denies any recent fevers, no abnormal rashes or lesions.    Cardiac risk factors include high cholesterol (not currently requiring medication) and smoking.  He does not currently carry a diagnosis of hypertension, systolic blood pressure in the emergency department today is 147.  He has no family history of early cardiac disease or thromboembolic disease.    Prior to transfer he was treated with nitroglycerin, aspirin, and morphine.  On arrival to the emergency department his chest pressure has nearly completely resolved.    Review of Systems:  See HPI for pertinent positives and negatives. All other systems negative.    Past Medical History:       Social History:  Social History     Social History Main Topics   • Smoking status: Not on file   • Smokeless tobacco: Not on file   • Alcohol use Not on file   • Drug use: Unknown   • Sexual activity: Not on file       Surgical History:  patient denies any surgical history    Current Medications:  Home  "Medications    **Home medications have not yet been reviewed for this encounter**         Allergies:  No Known Allergies    Physical Exam:  Vital Signs: /88   Pulse 60   Temp 36.4 °C (97.5 °F)   Resp 17   Ht 1.727 m (5' 8\")   Wt 90.7 kg (199 lb 15.3 oz)   SpO2 95%   BMI 30.40 kg/m²   Constitutional: Alert, no acute distress  HENT: Moist mucus membranes, normal posterior pharynx, no intraoral lesions  Eyes: Pupils equal and reactive, normal conjunctiva  Neck: Supple, normal range of motion, no stridor  Cardiovascular: Extremities are warm and well perfused, no murmur appreciated, normal cardiac auscultation, normal rate  Pulmonary: No respiratory distress, normal work of breathing, no accessory muscule usage, breath sounds clear and equal bilaterally  Abdomen: Soft, non-distended, non-tender to palpation, no peritoneal signs  Skin: Warm, dry, no rashes or lesions  Musculoskeletal: Normal range of motion in all extremities, no swelling or deformity noted  Neurologic: Alert, oriented, normal speech, normal motor function  Psychiatric: Normal and appropriate mood and affect    Medical records reviewed for continuity of care.  Medical records reviewed from transferring facility,[facility] emergency department.  On laboratory evaluation White blood count is 10.8, hemoglobin 15.3, platelets 176.  Creatinine elevated to 1.22.  Troponin is 0.22, d-dimer is 144 which is within normal reference range.  Patient was transferred to this facility for catheterization capability not present at referring facility.    EKG: Rate 57, sinus rhythm, no ST elevation or depressions, no T wave inversions, T wave flattening present in V4 through V6, as well as leads III and aVF.  No ectopy noted.  No significant change as compared to EKG performed at referring hospital.    Labs:  Labs Reviewed   CBC WITH DIFFERENTIAL - Abnormal; Notable for the following:        Result Value    WBC 13.1 (*)     RBC 4.62 (*)     MCH 33.1 (*)     " Neutrophils-Polys 73.60 (*)     Lymphocytes 18.30 (*)     Neutrophils (Absolute) 9.63 (*)     All other components within normal limits   COMP METABOLIC PANEL - Abnormal; Notable for the following:     Glucose 114 (*)     Total Protein 5.9 (*)     All other components within normal limits   TROPONIN - Abnormal; Notable for the following:     Troponin I 1.17 (*)     All other components within normal limits   BTYPE NATRIURETIC PEPTIDE   D-DIMER   ESTIMATED GFR       Radiology:  DX-CHEST-PORTABLE (1 VIEW)   Final Result         1.  No acute cardiopulmonary disease.           ED Medications Administered:  Medications - No data to display    Differential diagnosis:  ACS, pulmonary embolism, non-ST elevation MI    MDM:  History and physical exam as documented above.  Patient presents with diagnosed non-ST elevation MI.  On my interview, his history does seem most consistent with acute coronary syndrome, however he is a  which increases his risk for pulmonary embolism.  He is chest pain-free on my initial assessment.  He has no fevers, no tachycardia, no hypotension, no evidence of hemodynamic instability.    On laboratory evaluation white blood count is mildly elevated to 13.1.  CMP with no significant abnormalities.  Troponin is elevated to 1.17 consistent with an STEMI.  D-dimer is negative, less concerning for pulmonary embolism.    Patient remains chest pain-free in the emergency department.  Blood pressure is controlled, now 134/88.    I discussed the case with Dr. Neal, hospitalist, who will admit the patient.  Call placed to Dr. Hermosillo, cardiologist on call this morning.  He kindly agrees to evaluate the patient.    Disposition:  Admit to hospitalist in guarded condition    Final Impression:  1. Non-ST elevation (NSTEMI) myocardial infarction (HCC)        Electronically signed by: Lizbeth Abdullahi, 10/17/2018 5:07 AM

## 2018-10-17 NOTE — PROGRESS NOTES
Patient to have angio for NSTEMI. Obesity, tobacco use, hyperlipidemia. 2 weeks with episodes of chest pressure. Will follow for results of angio to determine ACS quality measures. Echo is currently ordered. If cath report does not address LVF, this echo must be completed prior to discharge.    Thank you and please call KIRK Morfin at [phone] with questions M-F

## 2018-10-17 NOTE — CATH LAB
Immediate Post-Operative Note      PreOp Diagnosis:   NSTEMI  Dyslipidemia.  Chronic tobacco use    PostOp Diagnosis:  1.  CAD.  Two-vessel.  Proximal circumflex 95%.  Mid RCA  with left-to-right lateral circulation.  2.  Normal left ejection fraction and wall motion.    Procedure(s) :  Coronary Angiography, Left Heart Catheterization, Left Ventriculography  Proximal circumflex 4.0 x 18 mm BHARTI.  Right radial artery approach.  Right femoral artery approach.    Surgeon(s):  David Orellana M.D.    Type of Anesthesia: Moderate Sedation    Specimen: None    Complications: None    Plan  Maximal optimal medical therapy for CAD post MI post PCI.  Future consideration of RCA  intervention.      David Orellana M.D.  10/17/2018 3:09 PM

## 2018-10-17 NOTE — ED NOTES
Patient updated on plan of care.  Possibly going to cath lab at 1500.  Patient talked to family, family on the way

## 2018-10-17 NOTE — ED NOTES
Med rec completed after calling [employer] (central search)  Pt had not filled any medication for cholesterol, only omeprazole

## 2018-10-18 ENCOUNTER — PATIENT OUTREACH (OUTPATIENT)
Dept: HEALTH INFORMATION MANAGEMENT | Facility: OTHER | Age: 53
End: 2018-10-18

## 2018-10-18 VITALS
BODY MASS INDEX: 30.31 KG/M2 | RESPIRATION RATE: 16 BRPM | TEMPERATURE: 98.2 F | DIASTOLIC BLOOD PRESSURE: 77 MMHG | HEIGHT: 68 IN | SYSTOLIC BLOOD PRESSURE: 126 MMHG | OXYGEN SATURATION: 93 % | WEIGHT: 199.96 LBS | HEART RATE: 66 BPM

## 2018-10-18 LAB
ANION GAP SERPL CALC-SCNC: 8 MMOL/L (ref 0–11.9)
BASOPHILS # BLD AUTO: 0.5 % (ref 0–1.8)
BASOPHILS # BLD: 0.05 K/UL (ref 0–0.12)
BUN SERPL-MCNC: 14 MG/DL (ref 8–22)
CALCIUM SERPL-MCNC: 9 MG/DL (ref 8.5–10.5)
CHLORIDE SERPL-SCNC: 111 MMOL/L (ref 96–112)
CHOLEST SERPL-MCNC: 201 MG/DL (ref 100–199)
CO2 SERPL-SCNC: 21 MMOL/L (ref 20–33)
CREAT SERPL-MCNC: 0.91 MG/DL (ref 0.5–1.4)
EKG IMPRESSION: NORMAL
EOSINOPHIL # BLD AUTO: 0.25 K/UL (ref 0–0.51)
EOSINOPHIL NFR BLD: 2.5 % (ref 0–6.9)
ERYTHROCYTE [DISTWIDTH] IN BLOOD BY AUTOMATED COUNT: 43.3 FL (ref 35.9–50)
GLUCOSE SERPL-MCNC: 89 MG/DL (ref 65–99)
HCT VFR BLD AUTO: 43.8 % (ref 42–52)
HDLC SERPL-MCNC: 34 MG/DL
HGB BLD-MCNC: 15.2 G/DL (ref 14–18)
IMM GRANULOCYTES # BLD AUTO: 0.07 K/UL (ref 0–0.11)
IMM GRANULOCYTES NFR BLD AUTO: 0.7 % (ref 0–0.9)
LDLC SERPL CALC-MCNC: 107 MG/DL
LYMPHOCYTES # BLD AUTO: 2.83 K/UL (ref 1–4.8)
LYMPHOCYTES NFR BLD: 27.7 % (ref 22–41)
MCH RBC QN AUTO: 32.7 PG (ref 27–33)
MCHC RBC AUTO-ENTMCNC: 34.7 G/DL (ref 33.7–35.3)
MCV RBC AUTO: 94.2 FL (ref 81.4–97.8)
MONOCYTES # BLD AUTO: 0.67 K/UL (ref 0–0.85)
MONOCYTES NFR BLD AUTO: 6.6 % (ref 0–13.4)
NEUTROPHILS # BLD AUTO: 6.33 K/UL (ref 1.82–7.42)
NEUTROPHILS NFR BLD: 62 % (ref 44–72)
NRBC # BLD AUTO: 0 K/UL
NRBC BLD-RTO: 0 /100 WBC
PLATELET # BLD AUTO: 169 K/UL (ref 164–446)
PMV BLD AUTO: 10.3 FL (ref 9–12.9)
POTASSIUM SERPL-SCNC: 3.6 MMOL/L (ref 3.6–5.5)
RBC # BLD AUTO: 4.65 M/UL (ref 4.7–6.1)
SODIUM SERPL-SCNC: 140 MMOL/L (ref 135–145)
TRIGL SERPL-MCNC: 301 MG/DL (ref 0–149)
WBC # BLD AUTO: 10.2 K/UL (ref 4.8–10.8)

## 2018-10-18 PROCEDURE — 99407 BEHAV CHNG SMOKING > 10 MIN: CPT | Performed by: INTERNAL MEDICINE

## 2018-10-18 PROCEDURE — A9270 NON-COVERED ITEM OR SERVICE: HCPCS | Performed by: INTERNAL MEDICINE

## 2018-10-18 PROCEDURE — 700105 HCHG RX REV CODE 258: Performed by: INTERNAL MEDICINE

## 2018-10-18 PROCEDURE — 80061 LIPID PANEL: CPT

## 2018-10-18 PROCEDURE — 85025 COMPLETE CBC W/AUTO DIFF WBC: CPT

## 2018-10-18 PROCEDURE — 700111 HCHG RX REV CODE 636 W/ 250 OVERRIDE (IP): Performed by: INTERNAL MEDICINE

## 2018-10-18 PROCEDURE — 99406 BEHAV CHNG SMOKING 3-10 MIN: CPT | Performed by: INTERNAL MEDICINE

## 2018-10-18 PROCEDURE — 93005 ELECTROCARDIOGRAM TRACING: CPT | Performed by: INTERNAL MEDICINE

## 2018-10-18 PROCEDURE — 700102 HCHG RX REV CODE 250 W/ 637 OVERRIDE(OP): Performed by: INTERNAL MEDICINE

## 2018-10-18 PROCEDURE — 99232 SBSQ HOSP IP/OBS MODERATE 35: CPT | Mod: 25 | Performed by: INTERNAL MEDICINE

## 2018-10-18 PROCEDURE — 99239 HOSP IP/OBS DSCHRG MGMT >30: CPT | Mod: 25 | Performed by: INTERNAL MEDICINE

## 2018-10-18 PROCEDURE — A9270 NON-COVERED ITEM OR SERVICE: HCPCS | Performed by: HOSPITALIST

## 2018-10-18 PROCEDURE — 93010 ELECTROCARDIOGRAM REPORT: CPT | Performed by: INTERNAL MEDICINE

## 2018-10-18 PROCEDURE — 36415 COLL VENOUS BLD VENIPUNCTURE: CPT

## 2018-10-18 PROCEDURE — 80048 BASIC METABOLIC PNL TOTAL CA: CPT

## 2018-10-18 PROCEDURE — 700102 HCHG RX REV CODE 250 W/ 637 OVERRIDE(OP): Performed by: HOSPITALIST

## 2018-10-18 RX ORDER — ASPIRIN 81 MG/1
81 TABLET ORAL DAILY
Qty: 30 TAB | Refills: 2 | Status: SHIPPED | OUTPATIENT
Start: 2018-10-19

## 2018-10-18 RX ORDER — ATORVASTATIN CALCIUM 40 MG/1
40 TABLET, FILM COATED ORAL
Qty: 30 TAB | Refills: 2 | Status: SHIPPED | OUTPATIENT
Start: 2018-10-18

## 2018-10-18 RX ORDER — PRASUGREL 10 MG/1
10 TABLET, FILM COATED ORAL DAILY
Qty: 30 TAB | Refills: 2 | Status: SHIPPED | OUTPATIENT
Start: 2018-10-19

## 2018-10-18 RX ADMIN — ENOXAPARIN SODIUM 40 MG: 100 INJECTION SUBCUTANEOUS at 09:44

## 2018-10-18 RX ADMIN — PRASUGREL 10 MG: 10 TABLET, FILM COATED ORAL at 05:34

## 2018-10-18 RX ADMIN — SODIUM CHLORIDE: 9 INJECTION, SOLUTION INTRAVENOUS at 04:17

## 2018-10-18 RX ADMIN — SENNOSIDES AND DOCUSATE SODIUM 2 TABLET: 8.6; 5 TABLET ORAL at 05:34

## 2018-10-18 RX ADMIN — ASPIRIN 81 MG: 81 TABLET, COATED ORAL at 05:34

## 2018-10-18 RX ADMIN — METOPROLOL TARTRATE 25 MG: 25 TABLET, FILM COATED ORAL at 05:34

## 2018-10-18 RX ADMIN — NITROGLYCERIN TRANSDERMAL SYSTEM 1 PATCH: 0.4 PATCH, EXTENDED RELEASE TRANSDERMAL at 05:34

## 2018-10-18 ASSESSMENT — ENCOUNTER SYMPTOMS
CHEST TIGHTNESS: 0
STRIDOR: 0
COUGH: 0
WHEEZING: 0
CHOKING: 0
APNEA: 0
SHORTNESS OF BREATH: 0

## 2018-10-18 ASSESSMENT — PAIN SCALES - GENERAL: PAINLEVEL_OUTOF10: 0

## 2018-10-18 NOTE — DISCHARGE SUMMARY
Discharge Summary    CHIEF COMPLAINT ON ADMISSION  Chief Complaint   Patient presents with   • Chest Pain       Reason for Admission  Transfer     Admission Date  10/17/2018    CODE STATUS  Full Code    HPI & HOSPITAL COURSE  53 y.o. male with history of dyslipidemia, and concurrent tobacco use, was in his usual state of health until 2 weeks prior to admission.  He began to have episodes of chest pressure. On the day of admission at midnight, he had the sudden onset of severe chest pressure, which did not alleviate with any maneuvers.   Patient was then admitted for diagnosis of NSTEMI.  Patient was noticed to have elevated troponin.  Patient was then put on heparin drip.  Coronary angiogram was performed and PCI was performed as well.  Detailed please see below procedure section.  Patient successfully received drug-eluting stent placed in proximal circumflex.  It is also noted patient has  on the right coronary and recommend future intervention by cardiology.  Patient currently stable and patient was then put on DA PT, beta-blocker, and high-dose statin.  Patient tolerated treatment and procedure very well.  Currently stable and ready to be discharged home.     Therefore, he is discharged in good and stable condition to home with close outpatient follow-up.    The patient recovered much more quickly than anticipated on admission.    Discharge Date  10/18/2018    FOLLOW UP ITEMS POST DISCHARGE  none    DISCHARGE DIAGNOSES      NSTEMI (non-ST elevated myocardial infarction) (HCC) POA: Yes    Tobacco use POA: Yes    Class 1 obesity due to excess calories without serious comorbidity with body mass index (BMI) of 30.0 to 30.9 in adult POA: Yes      FOLLOW UP  No future appointments.  [facility]  [phone] [address] [facility]   [address] [place] [address]   Call on 10/26/2018  You have been referred to Intensive Cardiac Rehab which is very important for your recovery.  Please call [facility] as they have Cardiac Rehab Programs in your area. Thank you.    Dr. Malini Álvarez  [facility]   [address], [place] [id number] [phone]  Go on 10/26/2018  Please arrive at 2:15 pm for your appointment with primary care.        MEDICATIONS ON DISCHARGE     Medication List      START taking these medications      Instructions   aspirin 81 MG EC tablet   Take 1 Tab by mouth every day.  Dose:  81 mg     atorvastatin 40 MG Tabs  Commonly known as:  LIPITOR   Take 1 Tab by mouth every bedtime.  Dose:  40 mg     metoprolol 25 MG Tabs  Commonly known as:  LOPRESSOR   Take 1 Tab by mouth 2 Times a Day.  Dose:  25 mg     prasugrel 10 MG Tabs  Commonly known as:  EFFIENT   Take 1 Tab by mouth every day.  Dose:  10 mg        CONTINUE taking these medications      Instructions   omeprazole 20 MG delayed-release capsule  Commonly known as:  PRILOSEC   Take 20 mg by mouth every day.  Dose:  20 mg            Allergies  No Known Allergies    DIET  Orders Placed This Encounter   Procedures   • Diet Order Cardiac     Standing Status:   Standing     Number of Occurrences:   1     Order Specific Question:   Diet:     Answer:   Cardiac [6]       ACTIVITY  As tolerated.  Weight bearing as tolerated    CONSULTATIONS  card    PROCEDURES  PostOp Diagnosis:  1.  CAD.  Two-vessel.  Proximal circumflex 95%.  Mid RCA  with left-to-right lateral circulation.  2.  Normal left ejection fraction and wall motion.     Procedure(s) :  Coronary Angiography, Left Heart Catheterization, Left Ventriculography  Proximal circumflex 4.0 x 18 mm BHARTI.  Right radial artery approach.  Right femoral artery approach.    OUTSIDE IMAGES-DX CHEST   Final Result      EC-ECHOCARDIOGRAM COMPLETE W/O CONT   Final Result   CONCLUSIONS  Mild concentric left ventricular hypertrophy.  Normal left ventricular systolic function.  Left ventricular ejection fraction is visually estimated to be 65%.  Normal diastolic  function.  Normal inferior vena cava size and inspiratory collapse.   DX-CHEST-PORTABLE (1 VIEW)   Final Result         1.  No acute cardiopulmonary disease.          OUTSIDE IMAGES-DX CHEST   Final Result      EC-ECHOCARDIOGRAM COMPLETE W/O CONT   Final Result      DX-CHEST-PORTABLE (1 VIEW)   Final Result         1.  No acute cardiopulmonary disease.            LABORATORY  Lab Results   Component Value Date    SODIUM 140 10/18/2018    POTASSIUM 3.6 10/18/2018    CHLORIDE 111 10/18/2018    CO2 21 10/18/2018    GLUCOSE 89 10/18/2018    BUN 14 10/18/2018    CREATININE 0.91 10/18/2018        Lab Results   Component Value Date    WBC 10.2 10/18/2018    HEMOGLOBIN 15.2 10/18/2018    HEMATOCRIT 43.8 10/18/2018    PLATELETCT 169 10/18/2018        Total time of the discharge process exceeds 37 minutes excluding smoking consultation.    Spent 11 minutes on tobacco cessation counseling including nicotine patches, gum, and dangers of smoking. Also discussed options of nicotine patch, medical treatment with wellbutrin and chantix. Discussed the risks of smoking including increased risk of heart disease, stroke, cancer and COPD. Discussed the benefits of quitting smoking. Nicotine replacement protocol provided to the patient.    The pt indicated that will quit.     [id number] (smoking and tobacco cessation counseling visit; intensive, greater than 10 minutes).

## 2018-10-18 NOTE — DISCHARGE INSTRUCTIONS
Discharge Instructions    Discharged to home by car with relative. Discharged via walking, hospital escort: Refused.  Special equipment needed: Not Applicable    Be sure to schedule a follow-up appointment with your primary care doctor or any specialists as instructed.     Discharge Plan:   Diet Plan: Discussed (Cardiac)  Activity Level: Discussed  Confirmed Follow up Appointment: Appointment Scheduled (Medical release paperwork signed for DARIA ANTOINE)  Confirmed Symptoms Management: Discussed  Medication Reconciliation Updated: Yes  Influenza Vaccine Indication: Patient Refuses    I understand that a diet low in cholesterol, fat, and sodium is recommended for good health. Unless I have been given specific instructions below for another diet, I accept this instruction as my diet prescription.   Other diet: Cardiac      Special Instructions: Diagnosis:  Acute Coronary Syndrome (ACS) is a diagnosis that encompasses cardiac-related chest pain and heart attack. ACS occurs when the blood flow to the heart muscle is severely reduced or cut off completely due to a slow process called atherosclerosis.  Atherosclerosis is a disease in which the coronary arteries become narrow from a buildup of fat, cholesterol, and other substances that combine to form plaque. If the plaque breaks, a blood clot will form and block the blood flow to the heart muscle. This lack of blood flow can cause damage or death to the heart muscle which is called a heart attack or Myocardial Infarction (MI). There are two different types of MIs:  ST Elevation Myocardial Infarction or STEMI (the most severe type of heart attack) and Non-ST Elevation Myocardial Infarction or NSTEMI.    Treatment Plan:  · Cardiac Diet  - Low fat, low salt, low cholesterol   · Cardiac Rehab  - Your doctor has ordered you a referral to [facility].  Call [phone] to schedule an appointment.  · Attend my follow-up appointment with my Cardiologist.  · Take my medications as  prescribed by my doctor  · Exercise daily  · Quit Smoking, Lower my bad cholesterol and raise my good cholesterol and Reduce stress    Medications:  Certain medications are used to treat ACS.  Remember to always take medications as prescribed and never stop talking medications unless told by your doctor.    You have been prescribed the following medicatons:    Aspirin - Aspirin is used as a blood thinning medication and you will require this medication indefinitely.  Anti-platelet/blood thinner - Effient, and is used in combination with aspirin to prevent clots from forming in your heart and/or around your stent.  Your doctor will determine how long you need to be on this medicine.  Beta-Blocker - Metoprolol is used to lower blood pressure and heart rate, and/or helps your heart heal after a heart attack.  Statin - Atorvastatin is used to lower cholesterol.    · Is patient discharged on Warfarin / Coumadin?   No     Depression / Suicide Risk    As you are discharged from this [facility] facility, it is important to learn how to keep safe from harming yourself.    Recognize the warning signs:  · Abrupt changes in personality, positive or negative- including increase in energy   · Giving away possessions  · Change in eating patterns- significant weight changes-  positive or negative  · Change in sleeping patterns- unable to sleep or sleeping all the time   · Unwillingness or inability to communicate  · Depression  · Unusual sadness, discouragement and loneliness  · Talk of wanting to die  · Neglect of personal appearance   · Rebelliousness- reckless behavior  · Withdrawal from people/activities they love  · Confusion- inability to concentrate     If you or a loved one observes any of these behaviors or has concerns about self-harm, here's what you can do:  · Talk about it- your feelings and reasons for harming yourself  · Remove any means that you might use to hurt yourself (examples: pills, rope, extension cords,  firearm)  · Get professional help from the community (Mental Health, Substance Abuse, psychological counseling)  · Do not be alone:Call your Safe Contact- someone whom you trust who will be there for you.  · Call your local CRISIS HOTLINE 204-5907 or 776-172-8104  · Call your local Children's Mobile Crisis Response Team [place] (766) 027-8106 or www.[employer]  · Call the toll free National Suicide Prevention Hotlines   · National Suicide Prevention Lifeline 375-110-QGYY (0418)  · National Hope Line Network 800-SUICIDE (339-0343)      Smoking Cessation, Tips for Success  If you are ready to quit smoking, congratulations! You have chosen to help yourself be healthier. Cigarettes bring nicotine, tar, carbon monoxide, and other irritants into your body. Your lungs, heart, and blood vessels will be able to work better without these poisons. There are many different ways to quit smoking. Nicotine gum, nicotine patches, a nicotine inhaler, or nicotine nasal spray can help with physical craving. Hypnosis, support groups, and medicines help break the habit of smoking.  WHAT THINGS CAN I DO TO MAKE QUITTING EASIER?   Here are some tips to help you quit for good:  · Pick a date when you will quit smoking completely. Tell all of your friends and family about your plan to quit on that date.  · Do not try to slowly cut down on the number of cigarettes you are smoking. Pick a quit date and quit smoking completely starting on that day.  · Throw away all cigarettes.    · Clean and remove all ashtrays from your home, work, and car.  · On a card, write down your reasons for quitting. Carry the card with you and read it when you get the urge to smoke.  · Cleanse your body of nicotine. Drink enough water and fluids to keep your urine clear or pale yellow. Do this after quitting to flush the nicotine from your body.  · Learn to predict your moods. Do not let a bad situation be your excuse to have a cigarette. Some situations  "in your life might tempt you into wanting a cigarette.  · Never have \"just one\" cigarette. It leads to wanting another and another. Remind yourself of your decision to quit.  · Change habits associated with smoking. If you smoked while driving or when feeling stressed, try other activities to replace smoking. Stand up when drinking your coffee. Brush your teeth after eating. Sit in a different chair when you read the paper. Avoid alcohol while trying to quit, and try to drink fewer caffeinated beverages. Alcohol and caffeine may urge you to smoke.  · Avoid foods and drinks that can trigger a desire to smoke, such as sugary or spicy foods and alcohol.  · Ask people who smoke not to smoke around you.  · Have something planned to do right after eating or having a cup of coffee. For example, plan to take a walk or exercise.  · Try a relaxation exercise to calm you down and decrease your stress. Remember, you may be tense and nervous for the first 2 weeks after you quit, but this will pass.  · Find new activities to keep your hands busy. Play with a pen, coin, or rubber band. Doodle or draw things on paper.  · Brush your teeth right after eating. This will help cut down on the craving for the taste of tobacco after meals. You can also try mouthwash.    · Use oral substitutes in place of cigarettes. Try using lemon drops, carrots, cinnamon sticks, or chewing gum. Keep them handy so they are available when you have the urge to smoke.  · When you have the urge to smoke, try deep breathing.  · Designate your home as a nonsmoking area.  · If you are a heavy smoker, ask your health care provider about a prescription for nicotine chewing gum. It can ease your withdrawal from nicotine.  · Reward yourself. Set aside the cigarette money you save and buy yourself something nice.  · Look for support from others. Join a support group or smoking cessation program. Ask someone at home or at work to help you with your plan to quit " "smoking.  · Always ask yourself, \"Do I need this cigarette or is this just a reflex?\" Tell yourself, \"Today, I choose not to smoke,\" or \"I do not want to smoke.\" You are reminding yourself of your decision to quit.  · Do not replace cigarette smoking with electronic cigarettes (commonly called e-cigarettes). The safety of e-cigarettes is unknown, and some may contain harmful chemicals.  · If you relapse, do not give up! Plan ahead and think about what you will do the next time you get the urge to smoke.  HOW WILL I FEEL WHEN I QUIT SMOKING?  You may have symptoms of withdrawal because your body is used to nicotine (the addictive substance in cigarettes). You may crave cigarettes, be irritable, feel very hungry, cough often, get headaches, or have difficulty concentrating. The withdrawal symptoms are only temporary. They are strongest when you first quit but will go away within 10-14 days. When withdrawal symptoms occur, stay in control. Think about your reasons for quitting. Remind yourself that these are signs that your body is healing and getting used to being without cigarettes. Remember that withdrawal symptoms are easier to treat than the major diseases that smoking can cause.   Even after the withdrawal is over, expect periodic urges to smoke. However, these cravings are generally short lived and will go away whether you smoke or not. Do not smoke!  WHAT RESOURCES ARE AVAILABLE TO HELP ME QUIT SMOKING?  Your health care provider can direct you to community resources or hospitals for support, which may include:  · Group support.  · Education.  · Hypnosis.  · Therapy.     This information is not intended to replace advice given to you by your health care provider. Make sure you discuss any questions you have with your health care provider.     Document Released: 09/15/2005 Document Revised: 01/08/2016 Document Reviewed: 06/05/2014  [employer] Interactive Patient Education ©2016 [employer]      "

## 2018-10-18 NOTE — PROGRESS NOTES
Notified MATT Albarran:    Because patient has Medical, if we fill prasugrel for him [facility] it will cost the patient $300+.    We can change the script to ticagrelor and patient can be given a sample bottle from the [facility] but this may result in issues with affording ticagrelor back at home or confusion if he is switched back to prasugrel.    Our other option is to give patient the prasugrel script and have him have it filled in [place] where it would be covered by Medical.    Have asked GUERO Ivey Pharmacist to go to the bedside and provide education on the importance of adherence to P2Y12 inhibitors and will await further guidance from MATT Albarran on which of the above options will be acceptable.

## 2018-10-18 NOTE — PROGRESS NOTES
Cardiology Follow Up Progress Note    Date of Service  10/18/2018    Attending Physician  Madyson Boudreaux M.D.    Chief Complaint     chest pain  HPI  Leander Dolan is a 53 y.o. male admitted 10/17/2018 with non-STEMI.  Interim Events  10/18/18, ambulated, no rhythm issues overnight, no angina, mild dyspnea    10/17/18, LHC, two-vessel CAD, proximal circumflex 95%, mid RCA  with collaterals s/p PCI to proximal circumflex, future consideration of RCA  intervention  Review of Systems  Review of Systems   Respiratory: Negative for apnea, cough, choking, chest tightness, shortness of breath, wheezing and stridor.    Cardiovascular: Negative for chest pain and leg swelling.       Vital signs in last 24 hours  Temp:  [36.3 °C (97.3 °F)-36.8 °C (98.2 °F)] 36.8 °C (98.2 °F)  Pulse:  [54-70] 66  Resp:  [14-18] 16  BP: (120-158)/() 126/77    Physical Exam  Physical Exam   Constitutional: He is oriented to person, place, and time. He appears well-developed.   Eyes: Conjunctivae are normal.   Neck: No JVD present. No thyromegaly present.   Cardiovascular: Normal rate and regular rhythm.    Pulses:       Carotid pulses are 2+ on the right side, and 2+ on the left side.       Radial pulses are 2+ on the right side, and 2+ on the left side.        Posterior tibial pulses are 2+ on the right side, and 2+ on the left side.   Pulmonary/Chest: He has no wheezes.   Abdominal: Soft.   Musculoskeletal: He exhibits no edema.   Neurological: He is alert and oriented to person, place, and time.   Skin: Skin is warm and dry.       Lab Review  Lab Results   Component Value Date/Time    WBC 10.2 10/18/2018 12:34 AM    RBC 4.65 (L) 10/18/2018 12:34 AM    HEMOGLOBIN 15.2 10/18/2018 12:34 AM    HEMATOCRIT 43.8 10/18/2018 12:34 AM    MCV 94.2 10/18/2018 12:34 AM    MCH 32.7 10/18/2018 12:34 AM    MCHC 34.7 10/18/2018 12:34 AM    MPV 10.3 10/18/2018 12:34 AM      Lab Results   Component Value Date/Time    SODIUM 140 10/18/2018 12:34 AM     POTASSIUM 3.6 10/18/2018 12:34 AM    CHLORIDE 111 10/18/2018 12:34 AM    CO2 21 10/18/2018 12:34 AM    GLUCOSE 89 10/18/2018 12:34 AM    BUN 14 10/18/2018 12:34 AM    CREATININE 0.91 10/18/2018 12:34 AM      Lab Results   Component Value Date/Time    ASTSGOT 16 10/17/2018 03:59 AM    ALTSGPT 16 10/17/2018 03:59 AM     Lab Results   Component Value Date/Time    CHOLSTRLTOT 201 (H) 10/18/2018 12:34 AM     (H) 10/18/2018 12:34 AM    HDL 34 (A) 10/18/2018 12:34 AM    TRIGLYCERIDE 301 (H) 10/18/2018 12:34 AM    TROPONINI 4.18 (H) 10/17/2018 06:17 PM       Recent Labs      10/17/18   0359   BNPBTYPENAT  12       Assessment/Plan  NSTEMI  Trop peaked at 4.69  S/p LHC 10/17/18, CTRO RCA & 95% pLCX s/p PCI to pLCX with future consideration of  RCA intervention.  Tobacco abuse  Hyperlipidemia  Situational stress  EF 65%    Continue with optimal medical therapy  Continue with aspirin 81, Lipitor 40 mg, metoprolol 25 mg BID, Effient 10 mg  Discussed in detail tobacco cessation  Follow-up with primary care physician and subsequently referral to cardiology office next week.  Will sign release of medical record prior to discharge.  I will provide angio film on CD prior to discharge.

## 2018-10-18 NOTE — PROGRESS NOTES
Cardiovascular Nurse Navigator ([id number]) Note:    NSTEMI, PCI 10/17/18 showing normal EF and Wall motion, this was correlated by echocardiogram 10/17/18.    Reviewed ACS medications:  · DAPT: aspirin + prasugrel - order received from Deion GUTIERREZ to order P2Y12 for meds to beds. Called in to [facility].  · Beta-Blocker:  lopressor, this is fine as there is no HF diagnosis or LVSD.  · Statin:  atorvastatin 40 mg  · Consider for aldosterone blockade?  no -- EF is 65%  · Consider for ACE-I?  no -- EF is 65%    Intensive Cardiac Rehab (ICR) Referral:  Referred on 10/17; has current inpatient orders for nutrition consult & PT for Phase I ICR    Demographics  Patient resides in: [place], [place]  Insurance: Medical    Inpatient & Discharge Patient Education:  Bedside nursing to continually provide patient education on ACS meds, signs and symptoms to monitor for, and risk factor modification.     Also at discharge please complete the “Acute Coronary Syndrome” special instructions on the AVS.          Orders Placed:    Social Work   n/a  REMSA  MI Program  Not eligible with medical  Outpatient Care Coordination  Not in the ACO    Follow up care    VM left for hospital schedulers requesting that they arrange NSTEMI f/u in patient's home town. Indicated likely discharge today.    Follow-up With  Details  Why  Contact Info   [facility]  Call on 10/26/2018  You have been referred to Intensive Cardiac Rehab which is very important for your recovery. Please call [facility] as they have Cardiac Rehab Programs in your area. Thank you.  [phone] [address]   [address]   [place] [address]        Thank you and please call with questions.

## 2018-10-18 NOTE — PROGRESS NOTES
"0720: Bedside report received.  Pt resting in bed.  Denies any pain or needs at this time.  R fem dressing c/d/i, R radial d/i --old scant drainage on dressing.  Call light and personal belongings within reach. Bed locked in lowest position.  Pt educated to call for assistance.     1120: Effient rejected by pts insurance at [facility] in [place], $336.    1130: Effient stated to be over $500 in [place] (?).  Called second [facility] with price of $91.32.  Pt and pt family agree this is acceptable for a few months.  Informed Shannan CARMONA, she stated that pt can take this for 2-3 months then cardiology can look for an alternative.  Pt updated and agrees.  Effient written script faxed to [place] [id number].  Pt also given written script.  All other prescriptions electronically sent to [facility] as requested by patient.    1250: Pt discharged.  All teaching completed--ACS, post angio and smoking cessation. Pt signed medical release--faxed.  Pt given CD with angio.  All questions answered.  Pt left hospital ambulatory with family.   ----------------------------------------------------------  1615: Pt paged MD regarding Effient as out of stock now at [facility].  [facility] in [place] (where pt lives) is in stock however pt will not be back home until late tomorrow night.  Shannan CARMONA notified.  2 tablets called in by NP to [facility] that all previous scripts were ordered to.  Written script faxed to [facility] in [place] for full month supply.  Called and updated patient--patient agreeable.     1710: [facility] unable to fill script for only 2 tablets.  Pt requesting full script be picked up at [facility], aware of cost and states that this is fine for this month.  MD updated and calling script to [facility].  -------------------------------------------------------------  1840: Pts daughter called unit--states \"my dad is having minor chest pain, is this normal?\"  This RN advised family member that the patient has been discharged and " this RN cannot give medical advise however that if he is having chest pain he should go to the nearest ER to be evaluated.She asked if she could ask an MD and this RN informed her of cards-on call phone number available in discharge teaching.  However again advised that patient should go to the ER if he is having active chest pain.

## 2018-10-18 NOTE — PROGRESS NOTES
Assumed care report received. Assessment completed. AOx4. Pt resting in bed complains of pain 3/10, denies intervention at this time. R radial and R femoral cath sites. No other complaints at this time. Plan of care discussed, verbalized understanding. Fall precautions in place. Call light within reach. Tele monitor in place. White board updated. Bed alarm in use. IV fluids running per MAR. Family at bedside.     Pt up to bathroom with CNA, RN in room, steady gait, SBA. Post op vitals being completed.

## 2018-10-19 ENCOUNTER — TELEPHONE (OUTPATIENT)
Dept: VASCULAR LAB | Facility: MEDICAL CENTER | Age: 53
End: 2018-10-19

## 2018-10-19 LAB — EKG IMPRESSION: NORMAL

## 2018-10-19 NOTE — PROCEDURES
DATE OF SERVICE:  10/17/2018    PROCEDURE:  Cardiac catheterization with Percutaneous coronary intervention:  A.  Left heart catheterization.  B.  Left ventriculography.  C.  Selective coronary angiography.  D.  Coronary stent implantation, mid circumflex artery, 4.0x18 mm Xience   drug-eluting stent.  E.  Right radial artery approach.  F.  Right femoral artery approach.    PREPROCEDURE DIAGNOSES:  1.  Non-ST elevation myocardial infarction.  2.  Dyslipidemia.  3.  Chronic tobacco use.    POSTPROCEDURE DIAGNOSES:  1.  Coronary artery disease, 2-vessel involving the mid circumflex artery   95% stenosis and mid right coronary artery chronic total occlusion with   left-to-right collateral circulation.  2.  Left ventricular ejection fraction is normal with 67% and normal wall   motion.    PHYSICIAN:  David Orellana MD    REFERRING PHYSICIAN:  Janusz Hermosillo MD    COMPLICATIONS:  None.    MEDICATIONS:  1.  Versed 2 mg IV.  2.  Fentanyl 200 mcg IV.  3.  Lidocaine 2% subcutaneous.  4.  Heparin 2000 units IA.  5.  Nitroglycerin 100 mcg IA.  6.  Verapamil 2.5 mg IA.  7.  Heparin intravenous infusion discontinued.  8.  Angiomax IV bolus and infusion.  9.  Nitroglycerin 510 mcg intracoronary in divided doses.  10.  Prasugrel 60 mg p.o.    INDICATIONS:  The patient is a 53-year-old male from [place], [place] with   a history of dyslipidemia and chronic tobacco use, who was transferred from   [facility] in [place], [place] to [facility] for   management of a non-ST elevation myocardial infarction.  The patient referred   for cardiac catheterization for post-MI risk stratification for future   coronary events.    DESCRIPTION OF PROCEDURE:  After informed consent was obtained, the patient   was brought to the cardiac catheterization laboratory where he was prepped,   draped, and anesthetized in usual manner.    After having established adequate collateral circulation to the right hand   with a  pressure and oximetry-guided Ezekiel's test, the volar surface of the   right wrist was prepped, draped, and anesthetized in usual manner.    Using ultrasound guidance and a modified Seldinger technique, a 6-Citizen of [place] x10   cm introducer sheath was inserted in the right radial artery.  Heparin,   verapamil, and nitroglycerin were given via the side port.    Next, a 5-Citizen [place] JL 3.5 left coronary catheter was inserted in the ostium of   the left coronary artery and left coronary angiograms were obtained in various   projections.    All subsequent catheter exchanges were done over an exchange length J-tipped   0.035 guidewire due to extreme tortuosity of the innominate artery.    Next, a 4-Citizen [place] JR 4.0 right coronary catheter was inserted in the ostium of   the right coronary artery and right coronary angiograms were obtained in   various projections.    Next, a 4-[place] pigtail catheter was inserted, but could not be advanced in   the ascending aorta due to the resistance related to the innominate artery   tortuosity.  The right radial artery approach was abandoned.    Initial review of the angiograms demonstrated a mid circumflex artery 95%   stenosis and a mid right coronary artery chronic total occlusion.  It was   elected to proceed with a coronary intervention of the mid circumflex artery.    Using ultrasound guidance and modified Seldinger technique, a 6-[place]izen [place] x 10 cm   introducer sheath was inserted in the left femoral artery.    Angiomax was given.    Next, A 6-[place]izen [place] extra backup 3.5 guiding catheter was inserted in the ostium   of the left coronary artery.    Next, a 0.014 Whisper wire was advanced to the distal circumflex artery.    Next, a 3.5x15 mm balloon catheter was inserted across the mid circumflex   stenosis and inflated up to 12 atmospheres.  The balloon was removed.    Next, a 4.0x18 mm Xience drug-eluting stent was placed across the mid vessel   stenosis and inflated up to 13 atmospheres.  The  balloon was removed.    Next, a 4.5x12 mm noncompliant balloon was inserted, but could not be advanced   outside the guide catheter into the circumflex artery.  This was exchanged   for a 4.0x8 mm balloon catheter that was advanced to within the stent and   three inflations of 11 atmospheres were performed throughout the stent.  The   balloon wire removed and final angiograms were performed.    Next, a 4-Tamazight pigtail catheter was advanced to the left ventricle on   fluoroscopic guidance.  A single plane BAUGH left ventricular angiogram was   performed.  Pre and post angiogram LVEDP, LV, and aortic pressures were   obtained.    At the end of the procedure, catheters were removed.  A right femoral artery angiogram was performed and an Angio-Seal device was   deployed for hemostasis of the right femoral artery. Hemostasis was achieved   with a wrist band device at the right radial artery site.      The patient tolerated the procedure well.    FINDINGS:  HEMODYNAMICS:  LEFT HEART PRESSURES:  1.  LVEDP 27 mmHg.  2.  Left ventricular systolic pressure 157 mmHg.  3.  Central aortic pressure systolic 162, diastolic 92, and mean of 116 mmHg.    LEFT VENTRICULOGRAPHY:  Left ventricular chamber size, wall motion, and   systolic function are normal.  Calculated ejection fraction 67%.    CORONARY ARTERIOGRAPHY:  1.  LEFT MAIN ARTERY:  Left main is a large caliber vessel, angiographically   normal and trifurcates into the left anterior descending artery, a ramus   intermedius vessel, and circumflex artery.  2.  LEFT ANTERIOR DESCENDING ARTERY:  Left anterior descending artery gives   rise to a normal complement of septal and diagonal branches and extends around   the apex to supply the distal inferior apex.  The proximal LAD has diffuse   mild atheromatous disease, but is otherwise widely patent.  3.  RAMUS INTERMEDIUS:  The ramus is a small caliber long vessel that is   angiographically patent.  4.  CIRCUMFLEX ARTERY:  The  circumflex is a large caliber codominant system   that gives rise to four major marginal and posterolateral branches.  Mid   circumflex artery has a 95% stenosis followed by serial 40% stenotic lesions.  5.  RIGHT CORONARY ARTERY:  The right coronary artery has a mid vessel   occlusion with bridging collateral circulation to the distal right coronary   artery in addition, has left-to-right collateral circulation from the left   coronary system.    POST-STENT IMPLANTATION of the mid circumflex artery with a 4.0x18 mm Xience   Christal drug-eluting stent demonstrates good stent expansion, 0% residual   stenosis, no evidence of dissection or thrombus and RAGINI 3 antegrade flow.    PLAN:  1.  Maximize optimal medical therapy for the patient's coronary artery disease   post-myocardial infarction and post-coronary intervention.  2.  Future consideration of coronary intervention of the right coronary artery   chronic total occlusion at the discretion of the patient's treating   cardiologist.       ____________________________________     MD DANA IRELAND / DELMER    DD:  10/18/2018 22:37:14  DT:  10/18/2018 23:33:56    D#:  [id number]  Job#:  [id number]

## 2018-10-19 NOTE — TELEPHONE ENCOUNTER
[facility]    Pt's insurance did not allow the [facility] to fill Effient prior to discharge.  Left  kindly asking pt to fill medication ASAP to avoid adverse outcomes.    Shlomo Sherman, PharmD